# Patient Record
Sex: MALE | Race: WHITE | NOT HISPANIC OR LATINO | Employment: FULL TIME | URBAN - METROPOLITAN AREA
[De-identification: names, ages, dates, MRNs, and addresses within clinical notes are randomized per-mention and may not be internally consistent; named-entity substitution may affect disease eponyms.]

---

## 2023-12-07 ENCOUNTER — TELEPHONE (OUTPATIENT)
Dept: DERMATOLOGY | Facility: CLINIC | Age: 58
End: 2023-12-07

## 2023-12-07 ENCOUNTER — OFFICE VISIT (OUTPATIENT)
Dept: PEDIATRICS | Facility: CLINIC | Age: 58
End: 2023-12-07
Payer: COMMERCIAL

## 2023-12-07 VITALS
HEART RATE: 60 BPM | BODY MASS INDEX: 29.46 KG/M2 | DIASTOLIC BLOOD PRESSURE: 80 MMHG | HEIGHT: 74 IN | RESPIRATION RATE: 20 BRPM | TEMPERATURE: 97.8 F | WEIGHT: 229.6 LBS | SYSTOLIC BLOOD PRESSURE: 136 MMHG | OXYGEN SATURATION: 99 %

## 2023-12-07 DIAGNOSIS — Z00.00 ROUTINE GENERAL MEDICAL EXAMINATION AT A HEALTH CARE FACILITY: Primary | ICD-10-CM

## 2023-12-07 DIAGNOSIS — Z12.11 SCREEN FOR COLON CANCER: ICD-10-CM

## 2023-12-07 DIAGNOSIS — M1A.00X0 IDIOPATHIC CHRONIC GOUT WITHOUT TOPHUS, UNSPECIFIED SITE: ICD-10-CM

## 2023-12-07 DIAGNOSIS — Z11.59 NEED FOR HEPATITIS C SCREENING TEST: ICD-10-CM

## 2023-12-07 DIAGNOSIS — F41.9 ANXIETY: ICD-10-CM

## 2023-12-07 DIAGNOSIS — Z12.5 SCREENING FOR PROSTATE CANCER: ICD-10-CM

## 2023-12-07 DIAGNOSIS — L98.9 SKIN LESION: ICD-10-CM

## 2023-12-07 DIAGNOSIS — E78.2 MIXED HYPERLIPIDEMIA: ICD-10-CM

## 2023-12-07 DIAGNOSIS — Z11.4 SCREENING FOR HIV (HUMAN IMMUNODEFICIENCY VIRUS): ICD-10-CM

## 2023-12-07 LAB
ALBUMIN SERPL BCG-MCNC: 4.8 G/DL (ref 3.5–5.2)
ALP SERPL-CCNC: 53 U/L (ref 40–150)
ALT SERPL W P-5'-P-CCNC: 56 U/L (ref 0–70)
ANION GAP SERPL CALCULATED.3IONS-SCNC: 15 MMOL/L (ref 7–15)
AST SERPL W P-5'-P-CCNC: 49 U/L (ref 0–45)
BASOPHILS # BLD AUTO: 0 10E3/UL (ref 0–0.2)
BASOPHILS NFR BLD AUTO: 0 %
BILIRUB SERPL-MCNC: 0.5 MG/DL
BUN SERPL-MCNC: 17.1 MG/DL (ref 6–20)
CALCIUM SERPL-MCNC: 9.6 MG/DL (ref 8.6–10)
CHLORIDE SERPL-SCNC: 98 MMOL/L (ref 98–107)
CHOLEST SERPL-MCNC: 257 MG/DL
CREAT SERPL-MCNC: 1.15 MG/DL (ref 0.67–1.17)
DEPRECATED HCO3 PLAS-SCNC: 25 MMOL/L (ref 22–29)
EGFRCR SERPLBLD CKD-EPI 2021: 74 ML/MIN/1.73M2
EOSINOPHIL # BLD AUTO: 0.2 10E3/UL (ref 0–0.7)
EOSINOPHIL NFR BLD AUTO: 2 %
ERYTHROCYTE [DISTWIDTH] IN BLOOD BY AUTOMATED COUNT: 11.8 % (ref 10–15)
FASTING STATUS PATIENT QL REPORTED: YES
GLUCOSE SERPL-MCNC: 88 MG/DL (ref 70–99)
HCT VFR BLD AUTO: 42.8 % (ref 40–53)
HCV AB SERPL QL IA: NONREACTIVE
HDLC SERPL-MCNC: 51 MG/DL
HGB BLD-MCNC: 14.2 G/DL (ref 13.3–17.7)
IMM GRANULOCYTES # BLD: 0 10E3/UL
IMM GRANULOCYTES NFR BLD: 0 %
LDLC SERPL CALC-MCNC: 182 MG/DL
LYMPHOCYTES # BLD AUTO: 1.8 10E3/UL (ref 0.8–5.3)
LYMPHOCYTES NFR BLD AUTO: 27 %
MCH RBC QN AUTO: 32.3 PG (ref 26.5–33)
MCHC RBC AUTO-ENTMCNC: 33.2 G/DL (ref 31.5–36.5)
MCV RBC AUTO: 98 FL (ref 78–100)
MONOCYTES # BLD AUTO: 0.6 10E3/UL (ref 0–1.3)
MONOCYTES NFR BLD AUTO: 8 %
NEUTROPHILS # BLD AUTO: 4.4 10E3/UL (ref 1.6–8.3)
NEUTROPHILS NFR BLD AUTO: 63 %
NONHDLC SERPL-MCNC: 206 MG/DL
PLATELET # BLD AUTO: 203 10E3/UL (ref 150–450)
POTASSIUM SERPL-SCNC: 3.7 MMOL/L (ref 3.4–5.3)
PROT SERPL-MCNC: 7.1 G/DL (ref 6.4–8.3)
PSA SERPL DL<=0.01 NG/ML-MCNC: 0.26 NG/ML (ref 0–3.5)
RBC # BLD AUTO: 4.39 10E6/UL (ref 4.4–5.9)
SODIUM SERPL-SCNC: 138 MMOL/L (ref 135–145)
TRIGL SERPL-MCNC: 122 MG/DL
URATE SERPL-MCNC: 6.5 MG/DL (ref 3.4–7)
WBC # BLD AUTO: 6.9 10E3/UL (ref 4–11)

## 2023-12-07 PROCEDURE — G0103 PSA SCREENING: HCPCS | Performed by: STUDENT IN AN ORGANIZED HEALTH CARE EDUCATION/TRAINING PROGRAM

## 2023-12-07 PROCEDURE — 90750 HZV VACC RECOMBINANT IM: CPT | Performed by: STUDENT IN AN ORGANIZED HEALTH CARE EDUCATION/TRAINING PROGRAM

## 2023-12-07 PROCEDURE — 99213 OFFICE O/P EST LOW 20 MIN: CPT | Mod: 25 | Performed by: STUDENT IN AN ORGANIZED HEALTH CARE EDUCATION/TRAINING PROGRAM

## 2023-12-07 PROCEDURE — 36415 COLL VENOUS BLD VENIPUNCTURE: CPT | Performed by: STUDENT IN AN ORGANIZED HEALTH CARE EDUCATION/TRAINING PROGRAM

## 2023-12-07 PROCEDURE — 84550 ASSAY OF BLOOD/URIC ACID: CPT | Performed by: STUDENT IN AN ORGANIZED HEALTH CARE EDUCATION/TRAINING PROGRAM

## 2023-12-07 PROCEDURE — 86803 HEPATITIS C AB TEST: CPT | Performed by: STUDENT IN AN ORGANIZED HEALTH CARE EDUCATION/TRAINING PROGRAM

## 2023-12-07 PROCEDURE — 90715 TDAP VACCINE 7 YRS/> IM: CPT | Performed by: STUDENT IN AN ORGANIZED HEALTH CARE EDUCATION/TRAINING PROGRAM

## 2023-12-07 PROCEDURE — 85025 COMPLETE CBC W/AUTO DIFF WBC: CPT | Performed by: STUDENT IN AN ORGANIZED HEALTH CARE EDUCATION/TRAINING PROGRAM

## 2023-12-07 PROCEDURE — 80061 LIPID PANEL: CPT | Performed by: STUDENT IN AN ORGANIZED HEALTH CARE EDUCATION/TRAINING PROGRAM

## 2023-12-07 PROCEDURE — 90480 ADMN SARSCOV2 VAC 1/ONLY CMP: CPT | Performed by: STUDENT IN AN ORGANIZED HEALTH CARE EDUCATION/TRAINING PROGRAM

## 2023-12-07 PROCEDURE — 91320 SARSCV2 VAC 30MCG TRS-SUC IM: CPT | Performed by: STUDENT IN AN ORGANIZED HEALTH CARE EDUCATION/TRAINING PROGRAM

## 2023-12-07 PROCEDURE — 87389 HIV-1 AG W/HIV-1&-2 AB AG IA: CPT | Performed by: STUDENT IN AN ORGANIZED HEALTH CARE EDUCATION/TRAINING PROGRAM

## 2023-12-07 PROCEDURE — 90471 IMMUNIZATION ADMIN: CPT | Performed by: STUDENT IN AN ORGANIZED HEALTH CARE EDUCATION/TRAINING PROGRAM

## 2023-12-07 PROCEDURE — 90472 IMMUNIZATION ADMIN EACH ADD: CPT | Performed by: STUDENT IN AN ORGANIZED HEALTH CARE EDUCATION/TRAINING PROGRAM

## 2023-12-07 PROCEDURE — 80053 COMPREHEN METABOLIC PANEL: CPT | Performed by: STUDENT IN AN ORGANIZED HEALTH CARE EDUCATION/TRAINING PROGRAM

## 2023-12-07 PROCEDURE — 99386 PREV VISIT NEW AGE 40-64: CPT | Mod: GC | Performed by: STUDENT IN AN ORGANIZED HEALTH CARE EDUCATION/TRAINING PROGRAM

## 2023-12-07 RX ORDER — ALLOPURINOL 100 MG/1
100 TABLET ORAL DAILY
COMMUNITY
Start: 2023-01-18 | End: 2023-12-07

## 2023-12-07 RX ORDER — ALLOPURINOL 100 MG/1
100 TABLET ORAL DAILY
Qty: 90 TABLET | Refills: 3 | Status: SHIPPED | OUTPATIENT
Start: 2023-12-07

## 2023-12-07 RX ORDER — PAROXETINE 30 MG/1
30 TABLET, FILM COATED ORAL DAILY
COMMUNITY
Start: 2023-09-29 | End: 2023-12-07

## 2023-12-07 RX ORDER — SIMVASTATIN 40 MG
40 TABLET ORAL AT BEDTIME
Qty: 90 TABLET | Refills: 3 | Status: SHIPPED | OUTPATIENT
Start: 2023-12-07

## 2023-12-07 RX ORDER — PAROXETINE 30 MG/1
30 TABLET, FILM COATED ORAL DAILY
Qty: 90 TABLET | Refills: 3 | Status: SHIPPED | OUTPATIENT
Start: 2023-12-07

## 2023-12-07 RX ORDER — SIMVASTATIN 40 MG
40 TABLET ORAL AT BEDTIME
COMMUNITY
Start: 2022-01-18 | End: 2023-12-07

## 2023-12-07 ASSESSMENT — PAIN SCALES - GENERAL: PAINLEVEL: NO PAIN (0)

## 2023-12-07 NOTE — LETTER
December 8, 2023      Jose Caceres  [address]  [place] [address]        Jose,     Your Hep C, prostate specific antigen (PSA), uric acid, and electrolytes, liver, and kidney panels looked within normal limits.     The LDL cholesterol was still a bit high at 182, so the cholesterol medicine should continue.      In fact, I think that focusing on some dietary change might further improve this.     Cholesterol and lipid values are broken down into three different types:  the LDL (or bad) cholesterol, the HDL (or good) cholesterol, and the Triglycerides.     The goals for each of these levels vary depending on your cardiac risk factors--that is, if you have more risk factors (like age, male sex, diabetes, hypertension, heart disease, stroke, smoking) then you would want your LDL cholesterol goal or your triglyceride goal to be lower.  If you have no risk factors, it may be acceptable to have your cholesterol level slightly higher.     The best way to achieve a lower cholesterol level is a diet that is similar to the Mediterranean diet: lean proteins like chicken, fish, and beans; lots of vegetables and fruits; whole grain foods; and healthy fats like olive oil, avocado, and almonds.  Try to avoid foods that are higher in saturated fats (like red meats and cheese) as well as fried foods like chips, crackers, and anything deep fried.     Your HDL cholesterol, while not as critical as the LDL and triglyceride levels, can be improved with  exercise--preferably 30-45 minutes of cardiovascular activity 3 to 4 times per week.            Edmar Hunter MD   Internal Medicine and Pediatrics     For Dr. Harris         Resulted Orders   Hepatitis C Screen Reflex to HCV RNA Quant and Genotype   Result Value Ref Range    Hepatitis C Antibody Nonreactive Nonreactive    Narrative    Assay performance characteristics have not been established for newborns, infants, and children.   Lipid panel reflex to direct LDL  Fasting   Result Value Ref Range    Cholesterol 257 (H) <200 mg/dL    Triglycerides 122 <150 mg/dL    Direct Measure HDL 51 >=40 mg/dL    LDL Cholesterol Calculated 182 (H) <=100 mg/dL    Non HDL Cholesterol 206 (H) <130 mg/dL    Patient Fasting > 8hrs? Yes     Narrative    Cholesterol  Desirable:  <200 mg/dL    Triglycerides  Normal:  Less than 150 mg/dL  Borderline High:  150-199 mg/dL  High:  200-499 mg/dL  Very High:  Greater than or equal to 500 mg/dL    Direct Measure HDL  Female:  Greater than or equal to 50 mg/dL   Male:  Greater than or equal to 40 mg/dL    LDL Cholesterol  Desirable:  <100mg/dL  Above Desirable:  100-129 mg/dL   Borderline High:  130-159 mg/dL   High:  160-189 mg/dL   Very High:  >= 190 mg/dL    Non HDL Cholesterol  Desirable:  130 mg/dL  Above Desirable:  130-159 mg/dL  Borderline High:  160-189 mg/dL  High:  190-219 mg/dL  Very High:  Greater than or equal to 220 mg/dL   PSA, screen   Result Value Ref Range    Prostate Specific Antigen Screen 0.26 0.00 - 3.50 ng/mL    Narrative    This result is obtained using the Roche Elecsys total PSA method on the mele [id number] immunoassay analyzer. Results obtained with different assay methods or kits cannot be used interchangeably.   Uric acid   Result Value Ref Range    Uric Acid 6.5 3.4 - 7.0 mg/dL   Comprehensive metabolic panel (BMP + Alb, Alk Phos, ALT, AST, Total. Bili, TP)   Result Value Ref Range    Sodium 138 135 - 145 mmol/L      Comment:      Reference intervals for this test were updated on 09/26/2023 to more accurately reflect our healthy population. There may be differences in the flagging of prior results with similar values performed with this method. Interpretation of those prior results can be made in the context of the updated reference intervals.     Potassium 3.7 3.4 - 5.3 mmol/L    Carbon Dioxide (CO2) 25 22 - 29 mmol/L    Anion Gap 15 7 - 15 mmol/L    Urea Nitrogen 17.1 6.0 - 20.0 mg/dL    Creatinine 1.15 0.67 - 1.17 mg/dL     GFR Estimate 74 >60 mL/min/1.73m2    Calcium 9.6 8.6 - 10.0 mg/dL    Chloride 98 98 - 107 mmol/L    Glucose 88 70 - 99 mg/dL    Alkaline Phosphatase 53 40 - 150 U/L      Comment:      Reference intervals for this test were updated on 11/14/2023 to more accurately reflect our healthy population. There may be differences in the flagging of prior results with similar values performed with this method. Interpretation of those prior results can be made in the context of the updated reference intervals.    AST 49 (H) 0 - 45 U/L      Comment:      Reference intervals for this test were updated on 6/12/2023 to more accurately reflect our healthy population. There may be differences in the flagging of prior results with similar values performed with this method. Interpretation of those prior results can be made in the context of the updated reference intervals.    ALT 56 0 - 70 U/L      Comment:      Reference intervals for this test were updated on 6/12/2023 to more accurately reflect our healthy population. There may be differences in the flagging of prior results with similar values performed with this method. Interpretation of those prior results can be made in the context of the updated reference intervals.      Protein Total 7.1 6.4 - 8.3 g/dL    Albumin 4.8 3.5 - 5.2 g/dL    Bilirubin Total 0.5 <=1.2 mg/dL   CBC with platelets and differential   Result Value Ref Range    WBC Count 6.9 4.0 - 11.0 10e3/uL    RBC Count 4.39 (L) 4.40 - 5.90 10e6/uL    Hemoglobin 14.2 13.3 - 17.7 g/dL    Hematocrit 42.8 40.0 - 53.0 %    MCV 98 78 - 100 fL    MCH 32.3 26.5 - 33.0 pg    MCHC 33.2 31.5 - 36.5 g/dL    RDW 11.8 10.0 - 15.0 %    Platelet Count 203 150 - 450 10e3/uL    % Neutrophils 63 %    % Lymphocytes 27 %    % Monocytes 8 %    % Eosinophils 2 %    % Basophils 0 %    % Immature Granulocytes 0 %    Absolute Neutrophils 4.4 1.6 - 8.3 10e3/uL    Absolute Lymphocytes 1.8 0.8 - 5.3 10e3/uL    Absolute Monocytes 0.6 0.0 - 1.3  10e3/uL    Absolute Eosinophils 0.2 0.0 - 0.7 10e3/uL    Absolute Basophils 0.0 0.0 - 0.2 10e3/uL    Absolute Immature Granulocytes 0.0 <=0.4 10e3/uL

## 2023-12-07 NOTE — TELEPHONE ENCOUNTER
Called patient- added to Dr. Chiang schedule as a spot only check  Thank you,    Keyanna HOWELLRN BSN  [facility]- [phone]

## 2023-12-07 NOTE — PATIENT INSTRUCTIONS
Check your blood pressure every few weeks. Let us know if it's staying above 130 (top number) or 90 (bottom number)    Preventive Health Recommendations  Male Ages 50 - 64    Yearly exam:             See your health care provider every year in order to  o   Review health changes.   o   Discuss preventive care.    o   Review your medicines if your doctor has prescribed any.   Have a cholesterol test every 5 years, or more frequently if you are at risk for high cholesterol/heart disease.   Have a diabetes test (fasting glucose) every three years. If you are at risk for diabetes, you should have this test more often.   Have a colonoscopy at age 45, or have a yearly FIT test (stool test). These exams will check for colon cancer.    Talk with your health care provider about whether or not a prostate cancer screening test (PSA) is right for you.  You should be tested each year for STDs (sexually transmitted diseases), if you re at risk.     Shots: Get a flu shot each year. Get a tetanus shot every 10 years.     Nutrition:  Eat at least 5 servings of fruits and vegetables daily.   Eat whole-grain bread, whole-wheat pasta and brown rice instead of white grains and rice.   Get adequate Calcium and Vitamin D.     Lifestyle  Exercise for at least 150 minutes a week (30 minutes a day, 5 days a week). This will help you control your weight and prevent disease.   Limit alcohol to one drink per day.   No smoking.   Wear sunscreen to prevent skin cancer.   See your dentist every six months for an exam and cleaning.   See your eye doctor every 1 to 2 years.

## 2023-12-07 NOTE — PROGRESS NOTES
Subjective   SUBJECTIVE:   Jose is a 58 year old, presenting for the following:  [facility] (ED/)        12/7/2023     9:09 AM   Additional Questions   Roomed by Celeste   Accompanied by self         12/7/2023     9:09 AM   Patient Reported Additional Medications   Patient reports taking the following new medications paxil refill,     Answers submitted by the patient for this visit:  General Questionnaire (Submitted on 12/7/2023)  Chief Complaint: Chronic problems general questions HPI Form  What is the reason for your visit today? : general checkup  How many servings of fruits and vegetables do you eat daily?: 2-3  On average, how many sweetened beverages do you drink each day (Examples: soda, juice, sweet tea, etc.  Do NOT count diet or artificially sweetened beverages)?: 0  How many minutes a day do you exercise enough to make your heart beat faster?: 20 to 29  How many days a week do you exercise enough to make your heart beat faster?: 4  How many days per week do you miss taking your medication?: 0      Here to [facility], saw [facility] for primary care previously.     Works as a , largely at desk during the day. Works out 4 days per week in the AM. Eats a balanced diet. Lives with wife and two kids, 14 and 17 years old.    Has a bump on the side of his nose he'd like looked at.    Gout  No flares for a few years since he's been on allopurinol. No other concerns.    Anxiety  Well-controlled on paroxetine for ~20 years now. Feels like it's still working well for him, no significant side effects.    HLD  On simvastatin, no concerns.    Today's PHQ-2 Score:       12/7/2023     8:49 AM   PHQ-2 ( 1999 [employer])   Q1: Little interest or pleasure in doing things 0   Q2: Feeling down, depressed or hopeless 0   PHQ-2 Score 0   Q1: Little interest or pleasure in doing things Not at all   Q2: Feeling down, depressed or hopeless Not at all   PHQ-2 Score 0     Social History     Tobacco  "Use    Smoking status: Never    Smokeless tobacco: Never   Substance Use Topics    Alcohol use: Not on file         Last PSA: No results found for: \"PSA\"    Reviewed orders with patient. Reviewed health maintenance and updated orders accordingly - Yes  Lab work is in process    Reviewed and updated as needed this visit by clinical staff   Tobacco   Meds              Reviewed and updated as needed this visit by Provider                   LOUISE DURON, CV, resp, GI, derm, MSK,  negative except as noted elsewhere.    OBJECTIVE:   /80   Pulse 60   Temp 97.8  F (36.6  C) (Tympanic)   Resp 20   Ht 1.88 m (6' 2\")   Wt 104.1 kg (229 lb 9.6 oz)   SpO2 99%   BMI 29.48 kg/m      GENERAL: healthy, alert and no distress  EYES: Eyes grossly normal to inspection, PERRL and conjunctivae and sclerae normal  HENT: ear canals and TM's normal, nose and mouth without ulcers or lesions  NECK: no adenopathy, no asymmetry, masses, or scars and thyroid normal to palpation  RESP: lungs clear to auscultation - no rales, rhonchi or wheezes  CV: regular rate and rhythm, normal S1 S2, no S3 or S4, no murmur, click or rub, no peripheral edema and peripheral pulses strong  ABDOMEN: soft, nontender, no hepatosplenomegaly, no masses and bowel sounds normal  MS: no gross musculoskeletal defects noted, no edema  SKIN: ~0.5 cm raised lesion on right side of nose with accompanying telangectasias (see photo below).   NEURO: Normal strength and tone, mentation intact and speech normal  PSYCH: mentation appears normal, affect normal/bright    Diagnostic Test Results:  Labs reviewed in Epic    Small lesion between nose and right eye:      ASSESSMENT/PLAN:   Jose was seen today for establish care.    Diagnoses and all orders for this visit:    Routine general medical examination at a health care facility  Doing generally well, referring to derm for nose lesion suspicious for BCC (he had a prior BCC removed years ago). Updating screenings " "and immunizations today, refilling chronic meds.    Screen for colon cancer  Working on obtaining records, unable to locate report from prior colonoscopy.    Screening for HIV (human immunodeficiency virus)  -     HIV Antigen Antibody Combo; Future  -     HIV Antigen Antibody Combo    Need for hepatitis C screening test  -     Hepatitis C Screen Reflex to HCV RNA Quant and Genotype; Future  -     Hepatitis C Screen Reflex to HCV RNA Quant and Genotype    Anxiety  -     PARoxetine (PAXIL) 30 MG tablet; Take 1 tablet (30 mg) by mouth daily    Mixed hyperlipidemia  -     Lipid panel reflex to direct LDL Fasting; Future  -     simvastatin (ZOCOR) 40 MG tablet; Take 1 tablet (40 mg) by mouth at bedtime  -     Comprehensive metabolic panel (BMP + Alb, Alk Phos, ALT, AST, Total. Bili, TP); Future  -     Lipid panel reflex to direct LDL Fasting  -     Comprehensive metabolic panel (BMP + Alb, Alk Phos, ALT, AST, Total. Bili, TP)    Idiopathic chronic gout without tophus, unspecified site  -     allopurinol (ZYLOPRIM) 100 MG tablet; Take 1 tablet (100 mg) by mouth daily  -     Uric acid; Future  -     CBC with platelets and differential; Future  -     Uric acid  -     CBC with platelets and differential    Screening for prostate cancer  -     PSA, screen; Future  -     PSA, screen    Skin lesion  -     Adult Dermatology  Referral; Future    Other orders  -     REVIEW OF HEALTH MAINTENANCE PROTOCOL ORDERS  -     ZOSTER RECOMBINANT ADJUVANTED (SHINGRIX)  -     TDAP 10-64Y (ADACEL,BOOSTRIX)  -     COVID-19 12+ (2023-24) (PFIZER)  -     PRIMARY CARE FOLLOW-UP SCHEDULING; Future    COUNSELING:   Reviewed preventive health counseling, as reflected in patient instructions    BMI:   Estimated body mass index is 29.48 kg/m  as calculated from the following:    Height as of this encounter: 1.88 m (6' 2\").    Weight as of this encounter: 104.1 kg (229 lb 9.6 oz).   Weight management plan: Discussed healthy diet and exercise " guidelines    He reports that he has never smoked. He has never used smokeless tobacco.          Juancarlos Harris MD  [facility]

## 2023-12-07 NOTE — TELEPHONE ENCOUNTER
This encounter is being sent to inform the clinic that this patient has a referral from Yolanda Perry Mai, MD in  IM/PEDS for the diagnoses of Skin lesion [L98.9] and has requested that this patient be seen within Priority: 1-2 Weeks. Based on the availability of our provider(s), we are unable to accommodate this request.    Were all sites offered this patient?  Yes    Does scheduling algorithm request to schedule next available?  Patient has been scheduled for the first available opening with Garry Newby MD on 3/7/2024.  We have informed the patient that the clinic will review their referral and reach out if a sooner appointment is medically necessary.     Can leave detailed message if you call back.

## 2023-12-08 LAB — HIV 1+2 AB+HIV1 P24 AG SERPL QL IA: NONREACTIVE

## 2023-12-11 ENCOUNTER — OFFICE VISIT (OUTPATIENT)
Dept: DERMATOLOGY | Facility: CLINIC | Age: 58
End: 2023-12-11
Payer: COMMERCIAL

## 2023-12-11 DIAGNOSIS — L57.8 ACTINIC SKIN DAMAGE: ICD-10-CM

## 2023-12-11 DIAGNOSIS — L81.4 LENTIGINES: ICD-10-CM

## 2023-12-11 DIAGNOSIS — L73.8 SENILE SEBACEOUS GLAND HYPERPLASIA: Primary | ICD-10-CM

## 2023-12-11 PROCEDURE — 99203 OFFICE O/P NEW LOW 30 MIN: CPT | Performed by: STUDENT IN AN ORGANIZED HEALTH CARE EDUCATION/TRAINING PROGRAM

## 2023-12-11 ASSESSMENT — PAIN SCALES - GENERAL: PAINLEVEL: NO PAIN (0)

## 2023-12-11 NOTE — LETTER
12/11/2023         RE: Jose Caceres  [address] [address]        Dear Colleague,    Thank you for referring your patient, Jose Caceres, to the [facility]. Please see a copy of my visit note below.    [facility] Dermatology Note    Encounter Date: Dec 11, 2023    Dermatology Problem List:    ______________________________________    Impression/Plan:  Jose was seen today for lesion.    Diagnoses and all orders for this visit:    Senile sebaceous gland hyperplasia  - benign  -Dermoscopy shows typical yellow globules with a wreath of vessels that are not in an arborizing pattern  - Overall reassured that this is sebaceous hyperplasia, and not basal cell carcinoma note  - If the area starts growing or bleeding then we would ask him to return to clinic for a biopsy    Actinic skin damage  Lentigines  - Reviewed the compounding benefits of incremental changes to sun protective clothing behaviors including increased frequency of sunscreen and sun protective clothing like broad brimmed hats and longsleeved UPF containing clothing          Follow-up PRN.       Staff Involved:  Staff Only    Max Gomez MD   of Dermatology  Department of Dermatology  [facility]      CC:   Chief Complaint   Patient presents with     Lesion       History of Present Illness:  Mr. Jose Caceres is a 58 year old male who presents as a new patient.    Presents for examination of a spot on his right medial cheek.  He has not noticed it previously, but states that his wife has noted it for years.  When he was brought to the attention of his primary care doctor, he was recommended to have it formally evaluated by dermatology.  The area has not been growing or bleeding and is asymptomatic    Labs:      Physical exam:  Vitals: There were no vitals taken for this visit.  GEN: well developed, well-nourished, in no  acute distress, in a pleasant mood.     SKIN: Horne phototype 1  - Sun-exposed skin, which includes the head/face, neck, both arms, digits, and/or nails was examined.   - Flat brown macules and patches in a sun exposed areas on face and extremities  - umbilicated yellow papule R medial cheek  - No other lesions of concern on areas examined.     Past Medical History:   Past Medical History:   Diagnosis Date     Squamous cell carcinoma of skin, unspecified      Past Surgical History:   Procedure Laterality Date     HEMORRHOIDECTOMY       LUMBAR DISCECTOMY         Social History:   reports that he has never smoked. He has never used smokeless tobacco. He reports current alcohol use of about 7.0 standard drinks of alcohol per week. He reports that he does not use drugs.    Family History:  Family History   Problem Relation Age of Onset     Hypertension Mother        Medications:  Current Outpatient Medications   Medication Sig Dispense Refill     allopurinol (ZYLOPRIM) 100 MG tablet Take 1 tablet (100 mg) by mouth daily 90 tablet 3     PARoxetine (PAXIL) 30 MG tablet Take 1 tablet (30 mg) by mouth daily 90 tablet 3     simvastatin (ZOCOR) 40 MG tablet Take 1 tablet (40 mg) by mouth at bedtime 90 tablet 3     No Known Allergies              Again, thank you for allowing me to participate in the care of your patient.        Sincerely,        Max Gomez MD

## 2023-12-11 NOTE — PROGRESS NOTES
[facility]at[facility] Note    Encounter Date: Dec 11, 2023    Dermatology Problem List:    ______________________________________    Impression/Plan:  Jose was seen today for lesion.    Diagnoses and all orders for this visit:    Senile sebaceous gland hyperplasia  - benign  -Dermoscopy shows typical yellow globules with a wreath of vessels that are not in an arborizing pattern  - Overall reassured that this is sebaceous hyperplasia, and not basal cell carcinoma note  - If the area starts growing or bleeding then we would ask him to return to clinic for a biopsy    Actinic skin damage  Lentigines  - Reviewed the compounding benefits of incremental changes to sun protective clothing behaviors including increased frequency of sunscreen and sun protective clothing like broad brimmed hats and longsleeved UPF containing clothing          Follow-up PRN.       Staff Involved:  Staff Only    Max Gomez MD   of Dermatology  Department of Dermatology  [facility]      CC:   Chief Complaint   Patient presents with    Lesion       History of Present Illness:  Mr. Jose Caceres is a 58 year old male who presents as a new patient.    Presents for examination of a spot on his right medial cheek.  He has not noticed it previously, but states that his wife has noted it for years.  When he was brought to the attention of his primary care doctor, he was recommended to have it formally evaluated by dermatology.  The area has not been growing or bleeding and is asymptomatic    Labs:      Physical exam:  Vitals: There were no vitals taken for this visit.  GEN: well developed, well-nourished, in no acute distress, in a pleasant mood.     SKIN: Horne phototype 1  - Sun-exposed skin, which includes the head/face, neck, both arms, digits, and/or nails was examined.   - Flat brown macules and patches in a sun exposed areas on face and extremities  -  umbilicated yellow papule R medial cheek  - No other lesions of concern on areas examined.     Past Medical History:   Past Medical History:   Diagnosis Date    Squamous cell carcinoma of skin, unspecified      Past Surgical History:   Procedure Laterality Date    HEMORRHOIDECTOMY      LUMBAR DISCECTOMY         Social History:   reports that he has never smoked. He has never used smokeless tobacco. He reports current alcohol use of about 7.0 standard drinks of alcohol per week. He reports that he does not use drugs.    Family History:  Family History   Problem Relation Age of Onset    Hypertension Mother        Medications:  Current Outpatient Medications   Medication Sig Dispense Refill    allopurinol (ZYLOPRIM) 100 MG tablet Take 1 tablet (100 mg) by mouth daily 90 tablet 3    PARoxetine (PAXIL) 30 MG tablet Take 1 tablet (30 mg) by mouth daily 90 tablet 3    simvastatin (ZOCOR) 40 MG tablet Take 1 tablet (40 mg) by mouth at bedtime 90 tablet 3     No Known Allergies

## 2025-02-19 DIAGNOSIS — E78.2 MIXED HYPERLIPIDEMIA: ICD-10-CM

## 2025-02-19 DIAGNOSIS — F41.9 ANXIETY: ICD-10-CM

## 2025-02-19 DIAGNOSIS — M1A.00X0 IDIOPATHIC CHRONIC GOUT WITHOUT TOPHUS, UNSPECIFIED SITE: ICD-10-CM

## 2025-02-19 RX ORDER — ALLOPURINOL 100 MG/1
100 TABLET ORAL DAILY
Qty: 90 TABLET | Refills: 0 | Status: CANCELLED | OUTPATIENT
Start: 2025-02-19

## 2025-02-19 RX ORDER — PAROXETINE 30 MG/1
30 TABLET, FILM COATED ORAL DAILY
Qty: 90 TABLET | Refills: 0 | Status: CANCELLED | OUTPATIENT
Start: 2025-02-19

## 2025-02-19 RX ORDER — SIMVASTATIN 40 MG
40 TABLET ORAL AT BEDTIME
Qty: 90 TABLET | Refills: 0 | Status: CANCELLED | OUTPATIENT
Start: 2025-02-19

## 2025-02-19 NOTE — TELEPHONE ENCOUNTER
Please call pt - I will provide a 90 day susan refill once I have more information.  He is overdue for his annual physical - please assist in scheduling or find out if he is getting care elsewhere.  Thanks.